# Patient Record
Sex: FEMALE | Race: WHITE | NOT HISPANIC OR LATINO | URBAN - METROPOLITAN AREA
[De-identification: names, ages, dates, MRNs, and addresses within clinical notes are randomized per-mention and may not be internally consistent; named-entity substitution may affect disease eponyms.]

---

## 2017-05-19 ENCOUNTER — EMERGENCY (EMERGENCY)
Facility: HOSPITAL | Age: 57
LOS: 1 days | Discharge: HOME | End: 2017-05-19
Attending: EMERGENCY MEDICINE | Admitting: EMERGENCY MEDICINE
Payer: COMMERCIAL

## 2017-05-19 VITALS
SYSTOLIC BLOOD PRESSURE: 122 MMHG | RESPIRATION RATE: 18 BRPM | TEMPERATURE: 98 F | DIASTOLIC BLOOD PRESSURE: 77 MMHG | OXYGEN SATURATION: 99 % | HEART RATE: 71 BPM

## 2017-05-19 VITALS
HEART RATE: 74 BPM | DIASTOLIC BLOOD PRESSURE: 74 MMHG | SYSTOLIC BLOOD PRESSURE: 129 MMHG | RESPIRATION RATE: 18 BRPM | OXYGEN SATURATION: 97 % | TEMPERATURE: 98 F

## 2017-05-19 DIAGNOSIS — R07.89 OTHER CHEST PAIN: ICD-10-CM
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
## 2017-05-19 DIAGNOSIS — M54.6 PAIN IN THORACIC SPINE: ICD-10-CM
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
## 2017-05-19 DIAGNOSIS — M54.5 LOW BACK PAIN: ICD-10-CM

## 2017-05-19 LAB — TROPONIN T SERPL-MCNC: 0.01 NG/ML — SIGNIFICANT CHANGE UP (ref 0–0.01)

## 2017-05-19 PROCEDURE — 71020: CPT | Mod: 26

## 2017-05-19 PROCEDURE — 93010 ELECTROCARDIOGRAM REPORT: CPT

## 2017-05-19 PROCEDURE — 72070 X-RAY EXAM THORAC SPINE 2VWS: CPT

## 2017-05-19 PROCEDURE — 72110 X-RAY EXAM L-2 SPINE 4/>VWS: CPT | Mod: 26

## 2017-05-19 PROCEDURE — 72110 X-RAY EXAM L-2 SPINE 4/>VWS: CPT

## 2017-05-19 PROCEDURE — 99285 EMERGENCY DEPT VISIT HI MDM: CPT | Mod: 25

## 2017-05-19 PROCEDURE — 71046 X-RAY EXAM CHEST 2 VIEWS: CPT

## 2017-05-19 PROCEDURE — 93005 ELECTROCARDIOGRAM TRACING: CPT

## 2017-05-19 PROCEDURE — 72070 X-RAY EXAM THORAC SPINE 2VWS: CPT | Mod: 26

## 2017-05-19 PROCEDURE — 99284 EMERGENCY DEPT VISIT MOD MDM: CPT | Mod: 25

## 2017-05-19 PROCEDURE — 84484 ASSAY OF TROPONIN QUANT: CPT

## 2017-05-19 PROCEDURE — 36415 COLL VENOUS BLD VENIPUNCTURE: CPT

## 2017-05-19 PROCEDURE — 99053 MED SERV 10PM-8AM 24 HR FAC: CPT

## 2017-05-19 RX ORDER — OXYCODONE HYDROCHLORIDE 5 MG/1
5 TABLET ORAL ONCE
Qty: 0 | Refills: 0 | Status: DISCONTINUED | OUTPATIENT
Start: 2017-05-19 | End: 2017-05-19

## 2017-05-19 RX ORDER — ACETAMINOPHEN 500 MG
975 TABLET ORAL ONCE
Qty: 0 | Refills: 0 | Status: COMPLETED | OUTPATIENT
Start: 2017-05-19 | End: 2017-05-19

## 2017-05-19 RX ADMIN — Medication 975 MILLIGRAM(S): at 02:22
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
## 2017-05-19 NOTE — ED PROVIDER NOTE - MEDICAL DECISION MAKING DETAILS
chest wall pain and back pain, no neurologic deficit on exam, will obtain screening ekg, 1 set of troponin though low suspicion for cardia contusion, xray T/L spine, low suspicion for acute fx given mechanism, c-spine clinically cleared based on nexus criteria and mechanism of injury

## 2017-05-19 NOTE — ED PROVIDER NOTE - DISCHARGE DATE
0149 pt to ed c/o sore throat ear ache  0230 pt medicated as directed, rapid strep and urine to lab  0240 D/c pt home, rx given . Pt aware of f/u instructions , aware to return for any changes or concerns. No further questions upon d/c home from ed   19-May-2017

## 2017-05-19 NOTE — ED PROVIDER NOTE - DIAGNOSTIC INTERPRETATION
ER Physician: Aj Salguero  CHEST XRAY INTERPRETATION: lungs clear, heart shadow normal, bony structures intact   ER Physician: Aj Salguero  LUMBAR SPINE INTERPRETATION:  no acute fracture; no soft tissue swelling noted; normal bony alignment.  ER Physician: Aj Salguero  THORACIC SPINE INTERPRETATION:  no acute fracture; no soft tissue swelling noted; normal bony alignment.
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
